# Patient Record
Sex: MALE | Race: WHITE | NOT HISPANIC OR LATINO | Employment: FULL TIME | URBAN - METROPOLITAN AREA
[De-identification: names, ages, dates, MRNs, and addresses within clinical notes are randomized per-mention and may not be internally consistent; named-entity substitution may affect disease eponyms.]

---

## 2023-11-03 PROBLEM — I51.9 LEFT VENTRICULAR SYSTOLIC DYSFUNCTION: Status: ACTIVE | Noted: 2023-11-03

## 2023-11-03 PROBLEM — M35.3 PMR (POLYMYALGIA RHEUMATICA) (MULTI): Status: ACTIVE | Noted: 2023-11-03

## 2023-11-03 PROBLEM — R00.2 PALPITATIONS: Status: ACTIVE | Noted: 2023-11-03

## 2023-11-03 PROBLEM — I51.89 LEFT VENTRICULAR SYSTOLIC DYSFUNCTION: Status: ACTIVE | Noted: 2023-11-03

## 2023-11-03 PROBLEM — R06.02 INCREASING SHORTNESS OF BREATH: Status: ACTIVE | Noted: 2023-11-03

## 2023-11-03 PROBLEM — I48.0 PAROXYSMAL ATRIAL FIBRILLATION (MULTI): Status: ACTIVE | Noted: 2023-11-03

## 2023-11-03 PROBLEM — M62.81 MUSCLE WEAKNESS OF EXTREMITY: Status: ACTIVE | Noted: 2023-11-03

## 2023-11-03 PROBLEM — R35.0 URINARY FREQUENCY: Status: ACTIVE | Noted: 2023-11-03

## 2023-11-03 PROBLEM — E78.5 HYPERLIPIDEMIA: Status: ACTIVE | Noted: 2023-11-03

## 2023-11-03 PROBLEM — E80.6 HYPERBILIRUBINEMIA: Status: ACTIVE | Noted: 2023-11-03

## 2023-11-03 PROBLEM — R79.82 CRP ELEVATED: Status: ACTIVE | Noted: 2023-11-03

## 2023-11-03 PROBLEM — D72.819 LEUKOPENIA: Status: ACTIVE | Noted: 2023-11-03

## 2023-11-03 PROBLEM — M79.10 MYALGIA: Status: ACTIVE | Noted: 2023-11-03

## 2023-11-03 PROBLEM — K40.90 LEFT INGUINAL HERNIA: Status: ACTIVE | Noted: 2023-11-03

## 2023-11-03 PROBLEM — R74.8 ELEVATED CPK: Status: ACTIVE | Noted: 2023-11-03

## 2023-11-03 PROBLEM — R73.9 HYPERGLYCEMIA: Status: ACTIVE | Noted: 2023-11-03

## 2023-11-03 PROBLEM — R79.89 ABNORMAL LFTS: Status: ACTIVE | Noted: 2023-11-03

## 2023-11-03 RX ORDER — EZETIMIBE 10 MG/1
1 TABLET ORAL DAILY
COMMUNITY
Start: 2018-02-09 | End: 2024-01-26

## 2023-11-03 RX ORDER — MULTIVIT WITH IRON,MINERALS
TABLET,CHEWABLE ORAL 2 TIMES DAILY
COMMUNITY

## 2023-11-03 RX ORDER — METOPROLOL TARTRATE 25 MG/1
25 TABLET, FILM COATED ORAL DAILY
COMMUNITY
Start: 2020-04-14 | End: 2024-03-06 | Stop reason: SDUPTHER

## 2023-11-03 RX ORDER — VIT C/E/ZN/COPPR/LUTEIN/ZEAXAN 250MG-90MG
CAPSULE ORAL 2 TIMES DAILY
COMMUNITY

## 2023-11-03 RX ORDER — ATORVASTATIN CALCIUM 40 MG/1
1 TABLET, FILM COATED ORAL DAILY
COMMUNITY
Start: 2018-05-15 | End: 2024-01-26

## 2023-11-06 ENCOUNTER — OFFICE VISIT (OUTPATIENT)
Dept: CARDIOLOGY | Facility: CLINIC | Age: 58
End: 2023-11-06
Payer: COMMERCIAL

## 2023-11-06 VITALS
HEIGHT: 72 IN | SYSTOLIC BLOOD PRESSURE: 126 MMHG | DIASTOLIC BLOOD PRESSURE: 80 MMHG | WEIGHT: 221 LBS | HEART RATE: 75 BPM | BODY MASS INDEX: 29.93 KG/M2 | OXYGEN SATURATION: 97 %

## 2023-11-06 DIAGNOSIS — R00.2 PALPITATIONS: ICD-10-CM

## 2023-11-06 DIAGNOSIS — I48.0 PAROXYSMAL ATRIAL FIBRILLATION (MULTI): Primary | ICD-10-CM

## 2023-11-06 DIAGNOSIS — I51.9 LEFT VENTRICULAR SYSTOLIC DYSFUNCTION: ICD-10-CM

## 2023-11-06 DIAGNOSIS — E78.00 PURE HYPERCHOLESTEROLEMIA: ICD-10-CM

## 2023-11-06 PROCEDURE — 93000 ELECTROCARDIOGRAM COMPLETE: CPT | Performed by: INTERNAL MEDICINE

## 2023-11-06 PROCEDURE — 99214 OFFICE O/P EST MOD 30 MIN: CPT | Performed by: INTERNAL MEDICINE

## 2023-11-06 RX ORDER — IBUPROFEN 600 MG/1
600 TABLET ORAL 3 TIMES DAILY
COMMUNITY
Start: 2023-09-22

## 2023-11-06 NOTE — PROGRESS NOTES
Subjective  Elías Hughes  is a 58 y.o. year old male who presents for F/U PAF.  Rare palpitations no chest pain, no dyspnea, no edema    Blood pressure 126/80, pulse 75, height 1.829 m (6'), weight 100 kg (221 lb), SpO2 97 %.   Patient has no known allergies.  Past Medical History:   Diagnosis Date    Personal history of other diseases of the circulatory system     History of atrial fibrillation    Personal history of other diseases of the digestive system 06/12/2019    History of left inguinal hernia     Past Surgical History:   Procedure Laterality Date    KNEE SURGERY  12/17/2014    Knee Surgery    OTHER SURGICAL HISTORY  03/22/2021    Back surgery    OTHER SURGICAL HISTORY  03/22/2021    Hand surgery    OTHER SURGICAL HISTORY  05/13/2020    Colonoscopy     No family history on file.  @SOC    Current Outpatient Medications   Medication Sig Dispense Refill     mg tablet Take 1 tablet (600 mg) by mouth 3 times a day.      atorvastatin (Lipitor) 40 mg tablet Take 1 tablet (40 mg) by mouth once daily.      ezetimibe (Zetia) 10 mg tablet Take 1 tablet (10 mg) by mouth once daily.      glucosamine-chondroitin (Osteo Bi-Flex) 250-200 mg tablet Take by mouth twice a day.      metoprolol tartrate (Lopressor) 25 mg tablet Take 1 tablet (25 mg) by mouth once daily.      multivitamin with minerals iron-free (Centrum Silver) Take 1 tablet by mouth once daily.      omega-3 fatty acids-fish oil 360-1,200 mg capsule Take by mouth twice a day.       No current facility-administered medications for this visit.        ROS  Review of Systems   All other systems reviewed and are negative.      Physical Exam  Physical Exam  Constitutional:       Appearance: Normal appearance.   HENT:      Head: Normocephalic and atraumatic.   Eyes:      Extraocular Movements: Extraocular movements intact.      Pupils: Pupils are equal, round, and reactive to light.   Cardiovascular:      Rate and Rhythm: Normal rate and regular rhythm.    Skin:     General: Skin is warm and dry.   Neurological:      Mental Status: He is alert.          EKG  Encounter Date: 11/06/23   ECG 12 lead (Clinic Performed)    Narrative    NSR at 75/min., WNL       Problem List Items Addressed This Visit          Cardiac and Vasculature    Paroxysmal atrial fibrillation (CMS/HCC) - Primary     S/P unsuccessful ablation attemtp at [facility]  15-20 years ago, [id number]=87/18T, no PSVT, no high grade AV block.  Ramins NSR         Relevant Orders    ECG 12 lead (Clinic Performed) (Completed)    Follow Up In Cardiology    Palpitations    Relevant Orders    ECG 12 lead (Clinic Performed) (Completed)    Left ventricular systolic dysfunction     Tachycardia related 3/17/23 echocardiogram LVEF = 35-40 %,; 5/14/20 LVEF improved to 55-60%         Relevant Orders    ECG 12 lead (Clinic Performed) (Completed)    Hyperlipidemia         No follow-ups on file.  Same meds with recheck EKG at 6 months visit  Derick Urbina MD

## 2023-11-06 NOTE — ASSESSMENT & PLAN NOTE
S/P unsuccessful ablation attemtp at [facility]  15-20 years ago, [id number]=16/18T, no PSVT, no high grade AV block.  Ramins NSR

## 2023-11-09 ENCOUNTER — OFFICE VISIT (OUTPATIENT)
Dept: PRIMARY CARE | Facility: CLINIC | Age: 58
End: 2023-11-09
Payer: COMMERCIAL

## 2023-11-09 VITALS
HEART RATE: 75 BPM | BODY MASS INDEX: 29.73 KG/M2 | HEIGHT: 72 IN | WEIGHT: 219.5 LBS | DIASTOLIC BLOOD PRESSURE: 64 MMHG | SYSTOLIC BLOOD PRESSURE: 112 MMHG | OXYGEN SATURATION: 99 %

## 2023-11-09 DIAGNOSIS — Z00.00 ROUTINE GENERAL MEDICAL EXAMINATION AT A HEALTH CARE FACILITY: Primary | ICD-10-CM

## 2023-11-09 LAB
ALBUMIN SERPL BCP-MCNC: 4.4 G/DL (ref 3.4–5)
ALP SERPL-CCNC: 79 U/L (ref 33–120)
ALT SERPL W P-5'-P-CCNC: 37 U/L (ref 10–52)
ANION GAP SERPL CALC-SCNC: 15 MMOL/L (ref 10–20)
AST SERPL W P-5'-P-CCNC: 31 U/L (ref 9–39)
BASOPHILS # BLD AUTO: 0.03 X10*3/UL (ref 0–0.1)
BASOPHILS NFR BLD AUTO: 0.5 %
BILIRUB SERPL-MCNC: 1.5 MG/DL (ref 0–1.2)
BUN SERPL-MCNC: 21 MG/DL (ref 6–23)
CALCIUM SERPL-MCNC: 8.9 MG/DL (ref 8.6–10.6)
CHLORIDE SERPL-SCNC: 107 MMOL/L (ref 98–107)
CHOLEST SERPL-MCNC: 155 MG/DL (ref 0–199)
CHOLESTEROL/HDL RATIO: 3.6
CO2 SERPL-SCNC: 24 MMOL/L (ref 21–32)
CREAT SERPL-MCNC: 0.74 MG/DL (ref 0.5–1.3)
EOSINOPHIL # BLD AUTO: 0.18 X10*3/UL (ref 0–0.7)
EOSINOPHIL NFR BLD AUTO: 2.7 %
ERYTHROCYTE [DISTWIDTH] IN BLOOD BY AUTOMATED COUNT: 14.3 % (ref 11.5–14.5)
EST. AVERAGE GLUCOSE BLD GHB EST-MCNC: 105 MG/DL
GFR SERPL CREATININE-BSD FRML MDRD: >90 ML/MIN/1.73M*2
GLUCOSE SERPL-MCNC: 73 MG/DL (ref 74–99)
HBA1C MFR BLD: 5.3 %
HCT VFR BLD AUTO: 42.1 % (ref 41–52)
HDLC SERPL-MCNC: 42.6 MG/DL
HGB BLD-MCNC: 13.4 G/DL (ref 13.5–17.5)
IMM GRANULOCYTES # BLD AUTO: 0.01 X10*3/UL (ref 0–0.7)
IMM GRANULOCYTES NFR BLD AUTO: 0.2 % (ref 0–0.9)
LDLC SERPL CALC-MCNC: 96 MG/DL
LYMPHOCYTES # BLD AUTO: 1.38 X10*3/UL (ref 1.2–4.8)
LYMPHOCYTES NFR BLD AUTO: 20.8 %
MCH RBC QN AUTO: 29.1 PG (ref 26–34)
MCHC RBC AUTO-ENTMCNC: 31.8 G/DL (ref 32–36)
MCV RBC AUTO: 91 FL (ref 80–100)
MONOCYTES # BLD AUTO: 0.57 X10*3/UL (ref 0.1–1)
MONOCYTES NFR BLD AUTO: 8.6 %
NEUTROPHILS # BLD AUTO: 4.45 X10*3/UL (ref 1.2–7.7)
NEUTROPHILS NFR BLD AUTO: 67.2 %
NON HDL CHOLESTEROL: 112 MG/DL (ref 0–149)
NRBC BLD-RTO: 0 /100 WBCS (ref 0–0)
PLATELET # BLD AUTO: 210 X10*3/UL (ref 150–450)
POTASSIUM SERPL-SCNC: 4.3 MMOL/L (ref 3.5–5.3)
PROT SERPL-MCNC: 6.6 G/DL (ref 6.4–8.2)
PSA SERPL-MCNC: 1.1 NG/ML
RBC # BLD AUTO: 4.61 X10*6/UL (ref 4.5–5.9)
SODIUM SERPL-SCNC: 142 MMOL/L (ref 136–145)
TRIGL SERPL-MCNC: 80 MG/DL (ref 0–149)
TSH SERPL-ACNC: 1.74 MIU/L (ref 0.44–3.98)
VLDL: 16 MG/DL (ref 0–40)
WBC # BLD AUTO: 6.6 X10*3/UL (ref 4.4–11.3)

## 2023-11-09 PROCEDURE — 99396 PREV VISIT EST AGE 40-64: CPT | Performed by: STUDENT IN AN ORGANIZED HEALTH CARE EDUCATION/TRAINING PROGRAM

## 2023-11-09 PROCEDURE — 83036 HEMOGLOBIN GLYCOSYLATED A1C: CPT

## 2023-11-09 PROCEDURE — 1036F TOBACCO NON-USER: CPT | Performed by: STUDENT IN AN ORGANIZED HEALTH CARE EDUCATION/TRAINING PROGRAM

## 2023-11-09 PROCEDURE — 85025 COMPLETE CBC W/AUTO DIFF WBC: CPT

## 2023-11-09 PROCEDURE — 36415 COLL VENOUS BLD VENIPUNCTURE: CPT

## 2023-11-09 PROCEDURE — 80053 COMPREHEN METABOLIC PANEL: CPT

## 2023-11-09 PROCEDURE — 84153 ASSAY OF PSA TOTAL: CPT

## 2023-11-09 PROCEDURE — 84443 ASSAY THYROID STIM HORMONE: CPT

## 2023-11-09 PROCEDURE — 80061 LIPID PANEL: CPT

## 2023-11-09 RX ORDER — ASCORBIC ACID/MULTIVIT-MIN 1000 MG
EFFERVESCENT POWDER IN PACKET ORAL
COMMUNITY

## 2023-11-09 NOTE — PROGRESS NOTES
Subjective   Patient ID: Elías Hughes is a 58 y.o. male who presents for Annual Exam (Is fasting).    HPI     HLD: stable, denies any muscle aches, hx of LLD in 230s, was on lipitor and add elevated CK that has gotten better, cholesterol is well controlled now on zetia and lipitor     A fib: paroxysmal A fib, denies any palpitations, SOB, or chest pain. Follow with cardiology. currently maintained on BB, well controlled, not on A fib     colonoscopy in 2019 recommend in 5 years     Sochx: , Denies smoker, former drinker    Review of Systems   All other systems reviewed and are negative.      Objective   /64 (BP Location: Right arm, Patient Position: Sitting)   Pulse 75   Ht 1.829 m (6')   Wt 99.6 kg (219 lb 8 oz)   SpO2 99%   BMI 29.77 kg/m²     Physical Exam  Constitutional:       Appearance: Normal appearance.   HENT:      Head: Normocephalic and atraumatic.      Right Ear: Tympanic membrane and ear canal normal.      Left Ear: Tympanic membrane and ear canal normal.      Mouth/Throat:      Mouth: Mucous membranes are moist.      Pharynx: Oropharynx is clear.   Eyes:      Extraocular Movements: Extraocular movements intact.      Conjunctiva/sclera: Conjunctivae normal.      Pupils: Pupils are equal, round, and reactive to light.   Cardiovascular:      Rate and Rhythm: Normal rate and regular rhythm.      Pulses: Normal pulses.      Heart sounds: Normal heart sounds.   Pulmonary:      Effort: Pulmonary effort is normal.      Breath sounds: Normal breath sounds.   Abdominal:      General: Abdomen is flat. Bowel sounds are normal.      Palpations: Abdomen is soft.   Musculoskeletal:         General: Normal range of motion.      Cervical back: Normal range of motion and neck supple.   Skin:     General: Skin is warm and dry.      Capillary Refill: Capillary refill takes 2 to 3 seconds.   Neurological:      General: No focal deficit present.      Mental Status: He is alert and  oriented to person, place, and time. Mental status is at baseline.   Psychiatric:         Mood and Affect: Mood normal.         Behavior: Behavior normal.         Thought Content: Thought content normal.         Judgment: Judgment normal.         Assessment/Plan   1. Routine general medical examination at a health care facility    - CBC and Auto Differential  - Comprehensive Metabolic Panel  - Lipid Panel  - TSH with reflex to Free T4 if abnormal  - Prostate Specific Antigen, Screen  - Hemoglobin A1C    2. A fib stable  - follows Cardiology

## 2024-01-26 DIAGNOSIS — E78.5 HYPERLIPIDEMIA, UNSPECIFIED HYPERLIPIDEMIA TYPE: Primary | ICD-10-CM

## 2024-01-26 RX ORDER — ATORVASTATIN CALCIUM 40 MG/1
40 TABLET, FILM COATED ORAL DAILY
Qty: 90 TABLET | Refills: 3 | Status: SHIPPED | OUTPATIENT
Start: 2024-01-26

## 2024-01-26 RX ORDER — EZETIMIBE 10 MG/1
10 TABLET ORAL DAILY
Qty: 90 TABLET | Refills: 3 | Status: SHIPPED | OUTPATIENT
Start: 2024-01-26

## 2024-03-06 DIAGNOSIS — I48.0 PAROXYSMAL ATRIAL FIBRILLATION (MULTI): Primary | ICD-10-CM

## 2024-03-06 RX ORDER — METOPROLOL TARTRATE 25 MG/1
25 TABLET, FILM COATED ORAL DAILY
Qty: 90 TABLET | Refills: 3 | Status: SHIPPED | OUTPATIENT
Start: 2024-03-06

## 2024-03-26 ENCOUNTER — OFFICE VISIT (OUTPATIENT)
Dept: PRIMARY CARE | Facility: CLINIC | Age: 59
End: 2024-03-26
Payer: COMMERCIAL

## 2024-03-26 VITALS
WEIGHT: 219 LBS | SYSTOLIC BLOOD PRESSURE: 112 MMHG | OXYGEN SATURATION: 97 % | BODY MASS INDEX: 29.7 KG/M2 | DIASTOLIC BLOOD PRESSURE: 62 MMHG | HEART RATE: 84 BPM

## 2024-03-26 DIAGNOSIS — R05.9 COUGH, UNSPECIFIED TYPE: Primary | ICD-10-CM

## 2024-03-26 PROCEDURE — 1036F TOBACCO NON-USER: CPT | Performed by: STUDENT IN AN ORGANIZED HEALTH CARE EDUCATION/TRAINING PROGRAM

## 2024-03-26 PROCEDURE — 99213 OFFICE O/P EST LOW 20 MIN: CPT | Performed by: STUDENT IN AN ORGANIZED HEALTH CARE EDUCATION/TRAINING PROGRAM

## 2024-03-26 RX ORDER — BENZONATATE 200 MG/1
200 CAPSULE ORAL 3 TIMES DAILY PRN
Qty: 42 CAPSULE | Refills: 0 | Status: SHIPPED | OUTPATIENT
Start: 2024-03-26 | End: 2024-04-25

## 2024-03-26 RX ORDER — BROMPHENIRAMINE MALEATE, PSEUDOEPHEDRINE HYDROCHLORIDE, AND DEXTROMETHORPHAN HYDROBROMIDE 2; 30; 10 MG/5ML; MG/5ML; MG/5ML
5 SYRUP ORAL 4 TIMES DAILY PRN
Qty: 120 ML | Refills: 0 | Status: SHIPPED | OUTPATIENT
Start: 2024-03-26 | End: 2024-04-05

## 2024-03-26 ASSESSMENT — PATIENT HEALTH QUESTIONNAIRE - PHQ9
SUM OF ALL RESPONSES TO PHQ9 QUESTIONS 1 AND 2: 0
2. FEELING DOWN, DEPRESSED OR HOPELESS: NOT AT ALL
1. LITTLE INTEREST OR PLEASURE IN DOING THINGS: NOT AT ALL

## 2024-03-26 NOTE — PROGRESS NOTES
Subjective   Patient ID: Elías Hughes is a 58 y.o. male who presents for Sinus Problem (3 weeks now/Scratchy throat/Runny nose/Took covid test fri & sun both neg).    HPI     URI: for 3 weeks, scratchy throat, cough up phlegm, yellowing was dark green has gone away. Recent trip to [place]. No sob, chest pian,no vomiting, no ear issues, no sinus issues. Just lingering cough. Taken nyquil and day quil    Review of Systems   All other systems reviewed and are negative.      Objective   /62 (BP Location: Left arm, Patient Position: Sitting, BP Cuff Size: Small adult)   Pulse 84   Wt 99.3 kg (219 lb)   SpO2 97%   BMI 29.70 kg/m²     Physical Exam  Constitutional:       Appearance: Normal appearance.   HENT:      Head: Normocephalic and atraumatic.      Right Ear: Tympanic membrane and ear canal normal.      Left Ear: Tympanic membrane and ear canal normal.      Mouth/Throat:      Mouth: Mucous membranes are moist.      Pharynx: Oropharynx is clear.   Eyes:      Extraocular Movements: Extraocular movements intact.      Conjunctiva/sclera: Conjunctivae normal.      Pupils: Pupils are equal, round, and reactive to light.   Cardiovascular:      Rate and Rhythm: Normal rate and regular rhythm.      Pulses: Normal pulses.      Heart sounds: Normal heart sounds.   Pulmonary:      Effort: Pulmonary effort is normal.      Breath sounds: Normal breath sounds.   Abdominal:      General: Abdomen is flat. Bowel sounds are normal.      Palpations: Abdomen is soft.   Musculoskeletal:         General: Normal range of motion.      Cervical back: Normal range of motion and neck supple.   Skin:     General: Skin is warm and dry.      Capillary Refill: Capillary refill takes 2 to 3 seconds.   Neurological:      General: No focal deficit present.      Mental Status: He is alert and oriented to person, place, and time. Mental status is at baseline.   Psychiatric:         Mood and Affect: Mood normal.         Behavior:  Behavior normal.         Thought Content: Thought content normal.         Judgment: Judgment normal.         Assessment/Plan   1. Cough, unspecified type    - benzonatate (Tessalon) 200 mg capsule; Take 1 capsule (200 mg) by mouth 3 times a day as needed for cough. Do not crush or chew.  Dispense: 42 capsule; Refill: 0  - brompheniramine-pseudoeph-DM 2-30-10 mg/5 mL syrup; Take 5 mL by mouth 4 times a day as needed for allergies for up to 10 days.  Dispense: 120 mL; Refill: 0

## 2024-05-09 ENCOUNTER — OFFICE VISIT (OUTPATIENT)
Dept: CARDIOLOGY | Facility: CLINIC | Age: 59
End: 2024-05-09
Payer: COMMERCIAL

## 2024-05-09 VITALS
HEART RATE: 87 BPM | OXYGEN SATURATION: 96 % | WEIGHT: 218 LBS | HEIGHT: 72 IN | DIASTOLIC BLOOD PRESSURE: 72 MMHG | BODY MASS INDEX: 29.53 KG/M2 | SYSTOLIC BLOOD PRESSURE: 120 MMHG

## 2024-05-09 DIAGNOSIS — I48.0 PAROXYSMAL ATRIAL FIBRILLATION (MULTI): Primary | ICD-10-CM

## 2024-05-09 DIAGNOSIS — I51.9 LEFT VENTRICULAR SYSTOLIC DYSFUNCTION: ICD-10-CM

## 2024-05-09 DIAGNOSIS — E78.00 PURE HYPERCHOLESTEROLEMIA: ICD-10-CM

## 2024-05-09 PROCEDURE — 99214 OFFICE O/P EST MOD 30 MIN: CPT | Performed by: INTERNAL MEDICINE

## 2024-05-09 PROCEDURE — 1036F TOBACCO NON-USER: CPT | Performed by: INTERNAL MEDICINE

## 2024-05-09 PROCEDURE — 93000 ELECTROCARDIOGRAM COMPLETE: CPT | Performed by: INTERNAL MEDICINE

## 2024-05-09 NOTE — ASSESSMENT & PLAN NOTE
S/P unsuccessful ablation attempt at [facility] 15-20 years ago; [id number] = 0; 4/18 Holter no Afib, no PSVT, no high grade AV block

## 2024-05-09 NOTE — PROGRESS NOTES
"  Subjective  Elías Hughes  is a 58 y.o. year old male who presents for paroxysmal atrial fibrillation.  Notes occ. Palpigagions but no \"a fib situations\".  No chest pain, no dyspnea, no edema    Blood pressure 120/72, pulse 87, height 1.829 m (6'), weight 98.9 kg (218 lb), SpO2 96%.   Patient has no known allergies.  Past Medical History:   Diagnosis Date    Personal history of other diseases of the circulatory system     History of atrial fibrillation    Personal history of other diseases of the digestive system 06/12/2019    History of left inguinal hernia     Past Surgical History:   Procedure Laterality Date    KNEE SURGERY  12/17/2014    Knee Surgery    OTHER SURGICAL HISTORY  03/22/2021    Back surgery    OTHER SURGICAL HISTORY  03/22/2021    Hand surgery    OTHER SURGICAL HISTORY  05/13/2020    Colonoscopy     No family history on file.  @SOC    Current Outpatient Medications   Medication Sig Dispense Refill    ascorbic acid-multivit-min (Emergen-C) 1,000 mg powder effervescent in packet Take by mouth.      atorvastatin (Lipitor) 40 mg tablet TAKE 1 TABLET DAILY 90 tablet 3    ezetimibe (Zetia) 10 mg tablet TAKE 1 TABLET DAILY 90 tablet 3    glucosamine-chondroitin (Osteo Bi-Flex) 250-200 mg tablet Take by mouth twice a day.       mg tablet Take 1 tablet (600 mg) by mouth 3 times a day.      metoprolol tartrate (Lopressor) 25 mg tablet TAKE 1 TABLET DAILY 90 tablet 3    multivitamin with minerals iron-free (Centrum Silver) Take 1 tablet by mouth once daily.      omega-3 fatty acids-fish oil 360-1,200 mg capsule Take by mouth twice a day.      zinc acetate 50 mg (zinc) capsule Take by mouth.       No current facility-administered medications for this visit.        ROS  Review of Systems   All other systems reviewed and are negative.      Physical Exam  Physical Exam  Constitutional:       Appearance: Normal appearance.   HENT:      Head: Normocephalic and atraumatic.   Cardiovascular:      Rate " and Rhythm: Normal rate and regular rhythm.      Heart sounds: S1 normal and S2 normal.   Pulmonary:      Effort: Pulmonary effort is normal.      Breath sounds: Normal breath sounds.   Abdominal:      Palpations: Abdomen is soft.   Musculoskeletal:      Right lower leg: No edema.      Left lower leg: No edema.   Skin:     General: Skin is warm and dry.   Neurological:      General: No focal deficit present.      Mental Status: He is alert and oriented to person, place, and time.   Psychiatric:         Mood and Affect: Mood normal.         Behavior: Behavior normal.          EKG  Encounter Date: 05/09/24   ECG 12 Lead    Narrative    Sinus arrhythmia at 73/min., occ. PVC's       Problem List Items Addressed This Visit       Paroxysmal atrial fibrillation (Multi) - Primary     S/P unsuccessful ablation attempt at [facility] 15-20 years ago; [id number] = 0; 4/18 Holter no Afib, no PSVT, no high grade AV block         Relevant Orders    ECG 12 Lead (Completed)    Follow Up In Cardiology    Left ventricular systolic dysfunction     Tachycafdia relatd 3/17/23 LVEF = 35-40%; 5/14/20 LVEF improved to 55-60%         Hyperlipidemia     11/8/23 Tchol = 155, HDL = 42.8, LDL = 96, Trig = 80              Same meds with return 6 months with EKG      Derick Urbina MD

## 2024-10-08 ENCOUNTER — CLINICAL SUPPORT (OUTPATIENT)
Dept: EMERGENCY MEDICINE | Facility: HOSPITAL | Age: 59
End: 2024-10-08
Payer: COMMERCIAL

## 2024-10-08 ENCOUNTER — HOSPITAL ENCOUNTER (EMERGENCY)
Facility: HOSPITAL | Age: 59
Discharge: HOME | End: 2024-10-08
Payer: COMMERCIAL

## 2024-10-08 VITALS
RESPIRATION RATE: 16 BRPM | BODY MASS INDEX: 29.8 KG/M2 | TEMPERATURE: 97.6 F | SYSTOLIC BLOOD PRESSURE: 122 MMHG | OXYGEN SATURATION: 96 % | WEIGHT: 220 LBS | HEART RATE: 91 BPM | HEIGHT: 72 IN | DIASTOLIC BLOOD PRESSURE: 70 MMHG

## 2024-10-08 PROCEDURE — 93005 ELECTROCARDIOGRAM TRACING: CPT

## 2024-10-08 PROCEDURE — 4500999001 HC ED NO CHARGE

## 2024-10-08 PROCEDURE — 99281 EMR DPT VST MAYX REQ PHY/QHP: CPT

## 2024-10-08 ASSESSMENT — PAIN DESCRIPTION - PAIN TYPE: TYPE: ACUTE PAIN

## 2024-10-08 ASSESSMENT — PAIN - FUNCTIONAL ASSESSMENT: PAIN_FUNCTIONAL_ASSESSMENT: 0-10

## 2024-10-08 ASSESSMENT — COLUMBIA-SUICIDE SEVERITY RATING SCALE - C-SSRS
6. HAVE YOU EVER DONE ANYTHING, STARTED TO DO ANYTHING, OR PREPARED TO DO ANYTHING TO END YOUR LIFE?: NO
1. IN THE PAST MONTH, HAVE YOU WISHED YOU WERE DEAD OR WISHED YOU COULD GO TO SLEEP AND NOT WAKE UP?: NO
2. HAVE YOU ACTUALLY HAD ANY THOUGHTS OF KILLING YOURSELF?: NO

## 2024-10-08 ASSESSMENT — PAIN SCALES - GENERAL: PAINLEVEL_OUTOF10: 0 - NO PAIN

## 2024-10-08 NOTE — ED TRIAGE NOTES
Patient with a hx of atrial fibrillation comes in today endorsing CP, rapid HR, and SOB x today; he states he is usually able to correct this on his own, but could not today; states while he was waiting to be triaged, he had an episode of non-bloody emesis, which did resolve his symptoms, and now reports 0/10 CP denies any other pertinent PMHx

## 2024-10-09 LAB
ATRIAL RATE: 103 BPM
P AXIS: 43 DEGREES
P OFFSET: 193 MS
P ONSET: 140 MS
PR INTERVAL: 146 MS
Q ONSET: 213 MS
QRS COUNT: 17 BEATS
QRS DURATION: 92 MS
QT INTERVAL: 358 MS
QTC CALCULATION(BAZETT): 468 MS
QTC FREDERICIA: 429 MS
R AXIS: 102 DEGREES
T AXIS: 44 DEGREES
T OFFSET: 392 MS
VENTRICULAR RATE: 103 BPM

## 2024-10-25 ENCOUNTER — OFFICE VISIT (OUTPATIENT)
Dept: URGENT CARE | Age: 59
End: 2024-10-25
Payer: COMMERCIAL

## 2024-10-25 VITALS
WEIGHT: 218 LBS | RESPIRATION RATE: 16 BRPM | DIASTOLIC BLOOD PRESSURE: 81 MMHG | BODY MASS INDEX: 29.57 KG/M2 | HEART RATE: 64 BPM | TEMPERATURE: 97.2 F | SYSTOLIC BLOOD PRESSURE: 138 MMHG | OXYGEN SATURATION: 96 %

## 2024-10-25 DIAGNOSIS — S60.352A: Primary | ICD-10-CM

## 2024-10-25 RX ORDER — CEPHALEXIN 500 MG/1
500 CAPSULE ORAL 4 TIMES DAILY
Qty: 28 CAPSULE | Refills: 0 | Status: SHIPPED | OUTPATIENT
Start: 2024-10-25 | End: 2024-11-01

## 2024-10-25 ASSESSMENT — PAIN SCALES - GENERAL: PAINLEVEL_OUTOF10: 5

## 2024-10-25 ASSESSMENT — ENCOUNTER SYMPTOMS: JOINT SWELLING: 1

## 2024-10-25 NOTE — PROGRESS NOTES
Subjective   Patient ID: Elías Hughes is a 59 y.o. male. They present today with a chief complaint of Finger Pain (Left thumb redness/pan/swelling X 1 day. Pt denies any injury. ).    History of Present Illness  HPI    Patient presents to urgent care for complaints left thumb and swelling for one day. Patient believes he may have a splinter in his thumb. Reports history of cellulitis.           Past Medical History  Allergies as of 10/25/2024    (No Known Allergies)       (Not in a hospital admission)       Past Medical History:   Diagnosis Date    Personal history of other diseases of the circulatory system     History of atrial fibrillation    Personal history of other diseases of the digestive system 06/12/2019    History of left inguinal hernia       Past Surgical History:   Procedure Laterality Date    KNEE SURGERY  12/17/2014    Knee Surgery    OTHER SURGICAL HISTORY  03/22/2021    Back surgery    OTHER SURGICAL HISTORY  03/22/2021    Hand surgery    OTHER SURGICAL HISTORY  05/13/2020    Colonoscopy        reports that he has quit smoking. His smoking use included cigarettes. He has never used smokeless tobacco. He reports current alcohol use. He reports that he does not use drugs.    Review of Systems  Review of Systems   Musculoskeletal:  Positive for joint swelling.                                  Objective    Vitals:    10/25/24 0804   BP: 138/81   Pulse: 64   Resp: 16   Temp: 36.2 °C (97.2 °F)   SpO2: 96%   Weight: 98.9 kg (218 lb)     No LMP for male patient.    Physical Exam  Vitals reviewed.   Constitutional:       Appearance: Normal appearance.   HENT:      Head: Normocephalic.   Eyes:      Conjunctiva/sclera: Conjunctivae normal.   Cardiovascular:      Rate and Rhythm: Normal rate and regular rhythm.      Heart sounds: Normal heart sounds.   Pulmonary:      Effort: Pulmonary effort is normal.      Breath sounds: Normal breath sounds.   Musculoskeletal:      Cervical back: Neck supple.    Skin:     General: Skin is warm and dry.      Comments: Left thumb with swelling and erythema. Foreign body noted.    Neurological:      Mental Status: He is alert.         Embedded Foreign Body Removal    Date/Time: 10/25/2024 9:37 AM    Performed by: BURAK Carolina  Authorized by: BURAK Carolina    Consent:     Consent obtained:  Verbal    Consent given by:  Patient    Risks discussed:  Infection, pain and incomplete removal  Location:     Location:  Finger    Finger location:  L thumb    Depth:  Subcutaneous    Tendon involvement:  None  Anesthesia:     Anesthesia method:  Topical application    Topical anesthetic:  Benzocaine gel  Procedure type:     Procedure complexity:  Simple  Procedure details:     Localization method:  Visualized    Intact foreign body removal: yes    Post-procedure details:     Neurovascular status: intact      Confirmation:  No additional foreign bodies on visualization    Skin closure:  None    Dressing:  Antibiotic ointment and adhesive bandage    Procedure completion:  Tolerated well, no immediate complications      Point of Care Test & Imaging Results from this visit  No results found for this visit on 10/25/24.   No results found.    Diagnostic study results (if any) were reviewed by BURAK Carolina.    Assessment/Plan   Allergies, medications, history, and pertinent labs/EKGs/Imaging reviewed by BURAK Carolina.     Medical Decision Making  Splinter removed from left thumb. Will start patient on keflex. Patient was told to keep wound clean and dry. You may use Bacitracin ointment.  You may soak finger in warm water and epsom salt. If you develop a fever, redness traveling down arm or increasing pain then go to the ER. Follow up with PCP if symptoms are not improving.     Orders and Diagnoses  Diagnoses and all orders for this visit:  Foreign body of skin of left thumb  -     cephalexin (Keflex) 500 mg capsule; Take 1 capsule (500  mg) by mouth 4 times a day for 7 days.      Medical Admin Record      Patient disposition: Home    Electronically signed by BURAK Carolina  8:25 AM

## 2024-11-07 ENCOUNTER — APPOINTMENT (OUTPATIENT)
Dept: CARDIOLOGY | Facility: CLINIC | Age: 59
End: 2024-11-07
Payer: COMMERCIAL

## 2024-11-07 VITALS
OXYGEN SATURATION: 96 % | WEIGHT: 227 LBS | DIASTOLIC BLOOD PRESSURE: 80 MMHG | SYSTOLIC BLOOD PRESSURE: 120 MMHG | BODY MASS INDEX: 30.75 KG/M2 | HEART RATE: 97 BPM | HEIGHT: 72 IN

## 2024-11-07 DIAGNOSIS — I48.0 PAROXYSMAL ATRIAL FIBRILLATION (MULTI): Primary | ICD-10-CM

## 2024-11-07 DIAGNOSIS — R00.2 PALPITATIONS: ICD-10-CM

## 2024-11-07 DIAGNOSIS — E78.00 PURE HYPERCHOLESTEROLEMIA: ICD-10-CM

## 2024-11-07 DIAGNOSIS — I51.9 LEFT VENTRICULAR SYSTOLIC DYSFUNCTION: ICD-10-CM

## 2024-11-07 LAB
ATRIAL RATE: 88 BPM
P AXIS: 32 DEGREES
P OFFSET: 194 MS
P ONSET: 137 MS
PR INTERVAL: 148 MS
Q ONSET: 211 MS
QRS COUNT: 15 BEATS
QRS DURATION: 92 MS
QT INTERVAL: 356 MS
QTC CALCULATION(BAZETT): 430 MS
QTC FREDERICIA: 404 MS
R AXIS: -5 DEGREES
T AXIS: 38 DEGREES
T OFFSET: 389 MS
VENTRICULAR RATE: 88 BPM

## 2024-11-07 PROCEDURE — 1036F TOBACCO NON-USER: CPT | Performed by: INTERNAL MEDICINE

## 2024-11-07 PROCEDURE — 93005 ELECTROCARDIOGRAM TRACING: CPT | Performed by: INTERNAL MEDICINE

## 2024-11-07 PROCEDURE — 99215 OFFICE O/P EST HI 40 MIN: CPT | Performed by: INTERNAL MEDICINE

## 2024-11-07 PROCEDURE — 3008F BODY MASS INDEX DOCD: CPT | Performed by: INTERNAL MEDICINE

## 2024-11-07 NOTE — ASSESSMENT & PLAN NOTE
S/P unsuccesful ablation attempt at [facility] 15-20 years ago. [id number] = 0; 4/18 Holter no afib, no PSVT, no high grade AV block (Ramicone)

## 2024-11-07 NOTE — PROGRESS NOTES
Subjective  Elías Hughes  is a 59 y.o. year old male who presents for F/U paroxysmal atrial fibrillation.  Having frequent palpitation going to ER for 1 hour worth of palpiatgions went to ER but the arrhythmia stopped after episode of nausea before he could be seen    Blood pressure 120/80, pulse 97, height 1.829 m (6'), weight 103 kg (227 lb), SpO2 96%.   Patient has no known allergies.  Past Medical History:   Diagnosis Date    Personal history of other diseases of the circulatory system     History of atrial fibrillation    Personal history of other diseases of the digestive system 06/12/2019    History of left inguinal hernia     Past Surgical History:   Procedure Laterality Date    KNEE SURGERY  12/17/2014    Knee Surgery    OTHER SURGICAL HISTORY  03/22/2021    Back surgery    OTHER SURGICAL HISTORY  03/22/2021    Hand surgery    OTHER SURGICAL HISTORY  05/13/2020    Colonoscopy     No family history on file.  @SOC    Current Outpatient Medications   Medication Sig Dispense Refill    ascorbic acid-multivit-min (Emergen-C) 1,000 mg powder effervescent in packet Take by mouth.      atorvastatin (Lipitor) 40 mg tablet TAKE 1 TABLET DAILY 90 tablet 3    ezetimibe (Zetia) 10 mg tablet TAKE 1 TABLET DAILY 90 tablet 3    glucosamine-chondroitin (Osteo Bi-Flex) 250-200 mg tablet Take by mouth twice a day.       mg tablet Take 1 tablet (600 mg) by mouth 3 times a day.      metoprolol tartrate (Lopressor) 25 mg tablet TAKE 1 TABLET DAILY 90 tablet 3    multivitamin with minerals iron-free (Centrum Silver) Take 1 tablet by mouth once daily.      omega-3 fatty acids-fish oil 360-1,200 mg capsule Take by mouth twice a day.      zinc acetate 50 mg (zinc) capsule Take by mouth.       No current facility-administered medications for this visit.        ROS  Review of Systems   All other systems reviewed and are negative.      Physical Exam  Physical Exam  Constitutional:       Appearance: He is obese.   HENT:       Head: Normocephalic and atraumatic.   Cardiovascular:      Rate and Rhythm: Normal rate and regular rhythm.      Heart sounds: S1 normal and S2 normal.   Pulmonary:      Effort: Pulmonary effort is normal.      Breath sounds: Normal breath sounds.   Abdominal:      Palpations: Abdomen is soft.   Musculoskeletal:      Right lower leg: No edema.      Left lower leg: No edema.   Skin:     General: Skin is warm and dry.   Neurological:      General: No focal deficit present.      Mental Status: He is alert and oriented to person, place, and time.   Psychiatric:         Mood and Affect: Mood normal.         Behavior: Behavior normal.          EKG  Encounter Date: 05/09/24   ECG 12 Lead    Narrative    Sinus arrhythmia at 73/min., occ. PVC's       Problem List Items Addressed This Visit       Paroxysmal atrial fibrillation (Multi) - Primary     S/P unsuccesful ablation attempt at [facility] 15-20 years ago. [id number] = 0; 4/18 Holter no afib, no PSVT, no high grade AV block (Ramicone)         Relevant Orders    ECG 12 Lead    Palpitations    Left ventricular systolic dysfunction     Tachycardia related 3/17/20 LVEF = 35-40%.  5/14/20 improved to 55-60%         Hyperlipidemia         Refer to Dr. Ramicone  Return 6 months with EKG      Derick Urbina MD

## 2024-11-14 ENCOUNTER — APPOINTMENT (OUTPATIENT)
Dept: PRIMARY CARE | Facility: CLINIC | Age: 59
End: 2024-11-14
Payer: COMMERCIAL

## 2024-11-14 VITALS
OXYGEN SATURATION: 96 % | HEART RATE: 86 BPM | HEIGHT: 72 IN | DIASTOLIC BLOOD PRESSURE: 70 MMHG | SYSTOLIC BLOOD PRESSURE: 112 MMHG | WEIGHT: 225 LBS | BODY MASS INDEX: 30.48 KG/M2

## 2024-11-14 DIAGNOSIS — E78.5 HYPERLIPIDEMIA, UNSPECIFIED HYPERLIPIDEMIA TYPE: ICD-10-CM

## 2024-11-14 DIAGNOSIS — Z00.00 ROUTINE GENERAL MEDICAL EXAMINATION AT A HEALTH CARE FACILITY: Primary | ICD-10-CM

## 2024-11-14 PROBLEM — M35.3 POLYMYALGIA RHEUMATICA (MULTI): Status: RESOLVED | Noted: 2023-11-03 | Resolved: 2024-11-14

## 2024-11-14 LAB
ALBUMIN SERPL BCP-MCNC: 4.7 G/DL (ref 3.4–5)
ALP SERPL-CCNC: 74 U/L (ref 33–120)
ALT SERPL W P-5'-P-CCNC: 55 U/L (ref 10–52)
ANION GAP SERPL CALC-SCNC: 16 MMOL/L (ref 10–20)
AST SERPL W P-5'-P-CCNC: 38 U/L (ref 9–39)
BASOPHILS # BLD AUTO: 0.03 X10*3/UL (ref 0–0.1)
BASOPHILS NFR BLD AUTO: 0.5 %
BILIRUB SERPL-MCNC: 1.9 MG/DL (ref 0–1.2)
BUN SERPL-MCNC: 29 MG/DL (ref 6–23)
CALCIUM SERPL-MCNC: 9.1 MG/DL (ref 8.6–10.6)
CHLORIDE SERPL-SCNC: 107 MMOL/L (ref 98–107)
CHOLEST SERPL-MCNC: 177 MG/DL (ref 0–199)
CHOLESTEROL/HDL RATIO: 4
CO2 SERPL-SCNC: 23 MMOL/L (ref 21–32)
CREAT SERPL-MCNC: 0.76 MG/DL (ref 0.5–1.3)
EGFRCR SERPLBLD CKD-EPI 2021: >90 ML/MIN/1.73M*2
EOSINOPHIL # BLD AUTO: 0.12 X10*3/UL (ref 0–0.7)
EOSINOPHIL NFR BLD AUTO: 2.1 %
ERYTHROCYTE [DISTWIDTH] IN BLOOD BY AUTOMATED COUNT: 13.3 % (ref 11.5–14.5)
EST. AVERAGE GLUCOSE BLD GHB EST-MCNC: 103 MG/DL
GLUCOSE SERPL-MCNC: 87 MG/DL (ref 74–99)
HBA1C MFR BLD: 5.2 %
HCT VFR BLD AUTO: 45.2 % (ref 41–52)
HDLC SERPL-MCNC: 44.1 MG/DL
HGB BLD-MCNC: 14.9 G/DL (ref 13.5–17.5)
IMM GRANULOCYTES # BLD AUTO: 0.02 X10*3/UL (ref 0–0.7)
IMM GRANULOCYTES NFR BLD AUTO: 0.4 % (ref 0–0.9)
LDLC SERPL CALC-MCNC: 115 MG/DL
LYMPHOCYTES # BLD AUTO: 1.36 X10*3/UL (ref 1.2–4.8)
LYMPHOCYTES NFR BLD AUTO: 24.2 %
MCH RBC QN AUTO: 30.4 PG (ref 26–34)
MCHC RBC AUTO-ENTMCNC: 33 G/DL (ref 32–36)
MCV RBC AUTO: 92 FL (ref 80–100)
MONOCYTES # BLD AUTO: 0.52 X10*3/UL (ref 0.1–1)
MONOCYTES NFR BLD AUTO: 9.3 %
NEUTROPHILS # BLD AUTO: 3.56 X10*3/UL (ref 1.2–7.7)
NEUTROPHILS NFR BLD AUTO: 63.5 %
NON HDL CHOLESTEROL: 133 MG/DL (ref 0–149)
NRBC BLD-RTO: 0 /100 WBCS (ref 0–0)
PLATELET # BLD AUTO: 198 X10*3/UL (ref 150–450)
POTASSIUM SERPL-SCNC: 4.5 MMOL/L (ref 3.5–5.3)
PROT SERPL-MCNC: 6.7 G/DL (ref 6.4–8.2)
PSA SERPL-MCNC: 1.04 NG/ML
RBC # BLD AUTO: 4.9 X10*6/UL (ref 4.5–5.9)
SODIUM SERPL-SCNC: 141 MMOL/L (ref 136–145)
TRIGL SERPL-MCNC: 89 MG/DL (ref 0–149)
TSH SERPL-ACNC: 1.68 MIU/L (ref 0.44–3.98)
VLDL: 18 MG/DL (ref 0–40)
WBC # BLD AUTO: 5.6 X10*3/UL (ref 4.4–11.3)

## 2024-11-14 PROCEDURE — 1036F TOBACCO NON-USER: CPT | Performed by: STUDENT IN AN ORGANIZED HEALTH CARE EDUCATION/TRAINING PROGRAM

## 2024-11-14 PROCEDURE — 85025 COMPLETE CBC W/AUTO DIFF WBC: CPT

## 2024-11-14 PROCEDURE — 83036 HEMOGLOBIN GLYCOSYLATED A1C: CPT

## 2024-11-14 PROCEDURE — 80053 COMPREHEN METABOLIC PANEL: CPT

## 2024-11-14 PROCEDURE — 3008F BODY MASS INDEX DOCD: CPT | Performed by: STUDENT IN AN ORGANIZED HEALTH CARE EDUCATION/TRAINING PROGRAM

## 2024-11-14 PROCEDURE — 84443 ASSAY THYROID STIM HORMONE: CPT

## 2024-11-14 PROCEDURE — 80061 LIPID PANEL: CPT

## 2024-11-14 PROCEDURE — 84153 ASSAY OF PSA TOTAL: CPT

## 2024-11-14 PROCEDURE — 99396 PREV VISIT EST AGE 40-64: CPT | Performed by: STUDENT IN AN ORGANIZED HEALTH CARE EDUCATION/TRAINING PROGRAM

## 2024-11-14 RX ORDER — EZETIMIBE 10 MG/1
10 TABLET ORAL DAILY
Qty: 90 TABLET | Refills: 3 | Status: SHIPPED | OUTPATIENT
Start: 2024-11-14

## 2024-11-14 RX ORDER — ATORVASTATIN CALCIUM 40 MG/1
40 TABLET, FILM COATED ORAL DAILY
Qty: 90 TABLET | Refills: 3 | Status: SHIPPED | OUTPATIENT
Start: 2024-11-14

## 2024-11-14 ASSESSMENT — ENCOUNTER SYMPTOMS: DEPRESSION: 0

## 2024-11-14 ASSESSMENT — PATIENT HEALTH QUESTIONNAIRE - PHQ9
2. FEELING DOWN, DEPRESSED OR HOPELESS: NOT AT ALL
1. LITTLE INTEREST OR PLEASURE IN DOING THINGS: NOT AT ALL
SUM OF ALL RESPONSES TO PHQ9 QUESTIONS 1 AND 2: 0

## 2024-11-14 NOTE — PROGRESS NOTES
Subjective   Patient ID: Elías Hughes is a 59 y.o. male who presents for Annual Exam (fasting).    HPI     HLD: stable, denies any muscle aches, hx of LLD in 230s, was on lipitor and add elevated CK that has gotten better, cholesterol is well controlled now on zetia and lipitor     A fib: paroxysmal A fib, denies any palpitations, SOB, or chest pain. Follow with cardiology. currently maintained on BB, well controlled, not on A fib     colonoscopy in 2019 recommend in 5 years, call ofr [facility]     Socx: , Denies smoker, former drinker       Review of Systems   All other systems reviewed and are negative.      Objective   /70 (BP Location: Right arm, Patient Position: Sitting, BP Cuff Size: Large adult)   Pulse 86   Ht 1.829 m (6')   Wt 102 kg (225 lb)   SpO2 96%   BMI 30.52 kg/m²     Physical Exam  Constitutional:       Appearance: Normal appearance.   HENT:      Head: Normocephalic and atraumatic.      Right Ear: Tympanic membrane and ear canal normal.      Left Ear: Tympanic membrane and ear canal normal.      Mouth/Throat:      Mouth: Mucous membranes are moist.      Pharynx: Oropharynx is clear.   Eyes:      Extraocular Movements: Extraocular movements intact.      Conjunctiva/sclera: Conjunctivae normal.      Pupils: Pupils are equal, round, and reactive to light.   Cardiovascular:      Rate and Rhythm: Normal rate and regular rhythm.      Pulses: Normal pulses.      Heart sounds: Normal heart sounds.   Pulmonary:      Effort: Pulmonary effort is normal.      Breath sounds: Normal breath sounds.   Abdominal:      General: Abdomen is flat. Bowel sounds are normal.      Palpations: Abdomen is soft.   Musculoskeletal:         General: Normal range of motion.      Cervical back: Normal range of motion and neck supple.   Skin:     General: Skin is warm and dry.      Capillary Refill: Capillary refill takes 2 to 3 seconds.   Neurological:      General: No focal deficit present.       Mental Status: He is alert and oriented to person, place, and time. Mental status is at baseline.   Psychiatric:         Mood and Affect: Mood normal.         Behavior: Behavior normal.         Thought Content: Thought content normal.         Judgment: Judgment normal.         Assessment/Plan   1. Hyperlipidemia, unspecified hyperlipidemia type    - atorvastatin (Lipitor) 40 mg tablet; Take 1 tablet (40 mg) by mouth once daily.  Dispense: 90 tablet; Refill: 3  - ezetimibe (Zetia) 10 mg tablet; Take 1 tablet (10 mg) by mouth once daily.  Dispense: 90 tablet; Refill: 3    2. Routine general medical examination at a health care facility (Primary)    - CBC and Auto Differential  - Comprehensive Metabolic Panel  - Lipid Panel  - Prostate Specific Antigen, Screen  - TSH with reflex to Free T4 if abnormal  - Hemoglobin A1C

## 2024-12-16 ENCOUNTER — OFFICE VISIT (OUTPATIENT)
Dept: CARDIOLOGY | Facility: CLINIC | Age: 59
End: 2024-12-16
Payer: COMMERCIAL

## 2024-12-16 VITALS
HEART RATE: 97 BPM | OXYGEN SATURATION: 96 % | SYSTOLIC BLOOD PRESSURE: 126 MMHG | HEIGHT: 72 IN | DIASTOLIC BLOOD PRESSURE: 74 MMHG | WEIGHT: 228 LBS | BODY MASS INDEX: 30.88 KG/M2

## 2024-12-16 DIAGNOSIS — I47.10 PAROXYSMAL SUPRAVENTRICULAR TACHYCARDIA (CMS-HCC): Chronic | ICD-10-CM

## 2024-12-16 DIAGNOSIS — I48.0 PAROXYSMAL ATRIAL FIBRILLATION (MULTI): Primary | ICD-10-CM

## 2024-12-16 PROCEDURE — 3008F BODY MASS INDEX DOCD: CPT | Performed by: INTERNAL MEDICINE

## 2024-12-16 PROCEDURE — 1036F TOBACCO NON-USER: CPT | Performed by: INTERNAL MEDICINE

## 2024-12-16 PROCEDURE — 99214 OFFICE O/P EST MOD 30 MIN: CPT | Performed by: INTERNAL MEDICINE

## 2024-12-16 NOTE — ASSESSMENT & PLAN NOTE
History of PSVT, with attempted ablation approximately 10 to 15 years ago through [facility].  At that time he had no inducible arrhythmias and therefore no ablation was performed.

## 2024-12-16 NOTE — PATIENT INSTRUCTIONS
[employer] - mobile ECG recording device.    7 day holter and echocardiogram.    Follow up with Dr Ramicone in 2-3 months.

## 2024-12-16 NOTE — ASSESSMENT & PLAN NOTE
Elías has been experiencing episodes of atrial fibrillation but we do not have sufficient arrhythmia documentation at this time.  An extended Holter monitor will be obtained.  I have also recommended that he purchase a home ECG recording device.  Some of the palpitations may be related to isolated PVCs but the has been experiencing atrial fibrillation.  We discussed the possibility of adding an antiarrhythmic drug or considering ablation for atrial fibrillation if necessary.  An echocardiogram will be obtained to assess the left atrial size and left ventricular systolic function in the event that an antiarrhythmic drug needs to be added.  Follow-up after testing is completed and further treatment recommendations will be made at that time.

## 2025-01-06 ENCOUNTER — HOSPITAL ENCOUNTER (OUTPATIENT)
Dept: CARDIOLOGY | Facility: CLINIC | Age: 60
Discharge: HOME | End: 2025-01-06
Payer: COMMERCIAL

## 2025-01-06 DIAGNOSIS — I48.0 PAROXYSMAL ATRIAL FIBRILLATION (MULTI): ICD-10-CM

## 2025-01-06 PROCEDURE — 93242 EXT ECG>48HR<7D RECORDING: CPT

## 2025-01-06 PROCEDURE — 93306 TTE W/DOPPLER COMPLETE: CPT | Performed by: INTERNAL MEDICINE

## 2025-01-06 PROCEDURE — C8929 TTE W OR WO FOL WCON,DOPPLER: HCPCS

## 2025-01-06 PROCEDURE — 2500000004 HC RX 250 GENERAL PHARMACY W/ HCPCS (ALT 636 FOR OP/ED): Performed by: INTERNAL MEDICINE

## 2025-01-06 RX ADMIN — PERFLUTREN 2 ML OF DILUTION: 6.52 INJECTION, SUSPENSION INTRAVENOUS at 14:55

## 2025-01-07 ENCOUNTER — APPOINTMENT (OUTPATIENT)
Dept: CARDIOLOGY | Facility: CLINIC | Age: 60
End: 2025-01-07
Payer: COMMERCIAL

## 2025-01-07 LAB
AORTIC VALVE MEAN GRADIENT: 3 MMHG
AORTIC VALVE PEAK VELOCITY: 1.3 M/S
AV PEAK GRADIENT: 7 MMHG
AVA (PEAK VEL): 3.81 CM2
AVA (VTI): 4.33 CM2
EJECTION FRACTION APICAL 4 CHAMBER: 61.8
EJECTION FRACTION: 58 %
LEFT VENTRICLE INTERNAL DIMENSION DIASTOLE: 4.82 CM (ref 3.5–6)
LEFT VENTRICULAR OUTFLOW TRACT DIAMETER: 2.49 CM
MITRAL VALVE E/A RATIO: 0.84
RIGHT VENTRICLE FREE WALL PEAK S': 0.16 CM/S
TRICUSPID ANNULAR PLANE SYSTOLIC EXCURSION: 2.7 CM

## 2025-01-26 DIAGNOSIS — I71.21 ANEURYSM OF ASCENDING AORTA WITHOUT RUPTURE (CMS-HCC): Primary | ICD-10-CM

## 2025-01-27 ENCOUNTER — TELEPHONE (OUTPATIENT)
Dept: CARDIOLOGY | Facility: CLINIC | Age: 60
End: 2025-01-27

## 2025-01-27 NOTE — TELEPHONE ENCOUNTER
Called patient and left detailed message.  Asked patient to call scheduling to get the CT scan scheduled that Dr. Ramicone wants him to have done for enlarged aorta.  Patient does have follow up scheduled with Dr. Ramicone February 2025.

## 2025-01-27 NOTE — TELEPHONE ENCOUNTER
----- Message from James Ramicone sent at 1/26/2025  7:40 AM EST -----  Regarding: Echocardiogram  Please let the patient know that his heart function was normal on the echocardiogram but his aorta is enlarged.  I placed an order for a CTA of the chest.  ----- Message -----  From: Interface, [employer] - Cardiology Results In  Sent: 1/7/2025   2:55 PM EST  To: James C Ramicone, DO

## 2025-02-04 DIAGNOSIS — R06.02 INCREASING SHORTNESS OF BREATH: ICD-10-CM

## 2025-02-04 DIAGNOSIS — I71.21 ASCENDING AORTIC ANEURYSM, UNSPECIFIED WHETHER RUPTURED (CMS-HCC): ICD-10-CM

## 2025-02-05 LAB
ANION GAP SERPL CALCULATED.4IONS-SCNC: 10 MMOL/L (CALC) (ref 7–17)
BUN SERPL-MCNC: 21 MG/DL (ref 7–25)
BUN/CREAT SERPL: 36 (CALC) (ref 6–22)
CALCIUM SERPL-MCNC: 9.3 MG/DL (ref 8.6–10.3)
CHLORIDE SERPL-SCNC: 107 MMOL/L (ref 98–110)
CO2 SERPL-SCNC: 25 MMOL/L (ref 20–32)
CREAT SERPL-MCNC: 0.58 MG/DL (ref 0.7–1.3)
EGFRCR SERPLBLD CKD-EPI 2021: 113 ML/MIN/1.73M2
GLUCOSE SERPL-MCNC: 81 MG/DL (ref 65–99)
POTASSIUM SERPL-SCNC: 4.2 MMOL/L (ref 3.5–5.3)
SODIUM SERPL-SCNC: 142 MMOL/L (ref 135–146)

## 2025-02-06 PROBLEM — E66.811 CLASS 1 OBESITY WITH BODY MASS INDEX (BMI) OF 30.0 TO 30.9 IN ADULT: Status: ACTIVE | Noted: 2025-02-06

## 2025-02-11 ENCOUNTER — HOSPITAL ENCOUNTER (OUTPATIENT)
Dept: RADIOLOGY | Facility: HOSPITAL | Age: 60
Discharge: HOME | End: 2025-02-11
Payer: COMMERCIAL

## 2025-02-11 DIAGNOSIS — I71.21 ANEURYSM OF ASCENDING AORTA WITHOUT RUPTURE (CMS-HCC): ICD-10-CM

## 2025-02-11 PROCEDURE — 71275 CT ANGIOGRAPHY CHEST: CPT

## 2025-02-11 PROCEDURE — 2550000001 HC RX 255 CONTRASTS: Performed by: INTERNAL MEDICINE

## 2025-02-11 RX ADMIN — IOHEXOL 100 ML: 350 INJECTION, SOLUTION INTRAVENOUS at 16:20

## 2025-02-28 ENCOUNTER — OFFICE VISIT (OUTPATIENT)
Dept: CARDIOLOGY | Facility: CLINIC | Age: 60
End: 2025-02-28
Payer: COMMERCIAL

## 2025-02-28 VITALS
OXYGEN SATURATION: 96 % | SYSTOLIC BLOOD PRESSURE: 152 MMHG | BODY MASS INDEX: 30.72 KG/M2 | WEIGHT: 226.8 LBS | HEART RATE: 79 BPM | HEIGHT: 72 IN | DIASTOLIC BLOOD PRESSURE: 84 MMHG

## 2025-02-28 DIAGNOSIS — I47.10 PAROXYSMAL SUPRAVENTRICULAR TACHYCARDIA (CMS-HCC): Chronic | ICD-10-CM

## 2025-02-28 DIAGNOSIS — I48.0 PAROXYSMAL ATRIAL FIBRILLATION (MULTI): Primary | Chronic | ICD-10-CM

## 2025-02-28 DIAGNOSIS — E78.00 PURE HYPERCHOLESTEROLEMIA: ICD-10-CM

## 2025-02-28 PROBLEM — D72.819 LEUKOPENIA: Status: RESOLVED | Noted: 2023-11-03 | Resolved: 2025-02-28

## 2025-02-28 PROBLEM — M79.10 MYALGIA: Status: RESOLVED | Noted: 2023-11-03 | Resolved: 2025-02-28

## 2025-02-28 PROBLEM — R06.02 INCREASING SHORTNESS OF BREATH: Status: RESOLVED | Noted: 2023-11-03 | Resolved: 2025-02-28

## 2025-02-28 PROBLEM — K40.90 LEFT INGUINAL HERNIA: Status: RESOLVED | Noted: 2023-11-03 | Resolved: 2025-02-28

## 2025-02-28 PROBLEM — M62.81 MUSCLE WEAKNESS OF EXTREMITY: Status: RESOLVED | Noted: 2023-11-03 | Resolved: 2025-02-28

## 2025-02-28 PROBLEM — R35.0 URINARY FREQUENCY: Status: RESOLVED | Noted: 2023-11-03 | Resolved: 2025-02-28

## 2025-02-28 PROCEDURE — 3008F BODY MASS INDEX DOCD: CPT | Performed by: INTERNAL MEDICINE

## 2025-02-28 PROCEDURE — 99214 OFFICE O/P EST MOD 30 MIN: CPT | Performed by: INTERNAL MEDICINE

## 2025-02-28 PROCEDURE — 1036F TOBACCO NON-USER: CPT | Performed by: INTERNAL MEDICINE

## 2025-02-28 RX ORDER — DILTIAZEM HYDROCHLORIDE 120 MG/1
120 CAPSULE, COATED, EXTENDED RELEASE ORAL DAILY
Qty: 30 CAPSULE | Refills: 11 | Status: SHIPPED | OUTPATIENT
Start: 2025-02-28 | End: 2026-02-28

## 2025-02-28 RX ORDER — FERROUS SULFATE 325(65) MG
325 TABLET ORAL AS NEEDED
COMMUNITY

## 2025-02-28 RX ORDER — TURMERIC 400 MG
1 CAPSULE ORAL 2 TIMES DAILY
COMMUNITY

## 2025-02-28 NOTE — ASSESSMENT & PLAN NOTE
Given the presence of coronary artery calcifications, I would recommend aggressive LDL reduction.  If Elías is unable to take higher doses of a statin for further LDL reduction, a PCSK9 inhibitor could be considered.

## 2025-02-28 NOTE — ASSESSMENT & PLAN NOTE
Brief runs of SVT up to 20 beats in duration were noted on the Holter monitor.  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  He did have an EP study at [facility] in the past and the arrhythmia was not inducible and could not be ablated.  I have recommended a trial of a calcium channel blocker and we will place him on Cardizem  mg daily.  Metoprolol will be discontinued.  The dose will be titrated up as needed.  EP follow-up in 8 to 10 months.

## 2025-02-28 NOTE — PROGRESS NOTES
History Of Present Illness:      This is a 59-year-old male with paroxysmal atrial fibrillation.  He presents today for a follow-up evaluation after completing an extended Holter monitor.  The patient is having some episodes of palpitations.  They are usually very brief.  The metoprolol has not been effective.  He states that he has episodes of atrial fibrillation about once or twice per year.    Elías was initially seen in consultation December 16, 2024 at the request of Dr. Urbina.  The patient has a history of SVT and had an attempted ablation at [facility] 10 to 15 years ago.  He states that at that time they could not induce tachycardia and therefore no ablation was performed.  During the years of 2019/2020 he began experiencing episodes of atrial fibrillation.  The first episode was documented in 2019.  Another episode occurred 2 months ago and he was going to be evaluated in the emergency department at [facility].  However, because of the long wait in the waiting room he ended up leaving without evaluation but he had spontaneously converted to sinus rhythm.  He also reports feeling frequent palpitations and based on the description may be isolated PVCs.     Past surgical history:     Hernia repair  Knee surgery     Social history: Non-smoker     Family history: Father had bypass surgery at 77 years of age.    Review of Systems  Other review of systems negative  Last Recorded Vitals:      10/8/2024     2:49 PM 10/8/2024     7:52 PM 10/25/2024     8:04 AM 11/7/2024     3:05 PM 11/7/2024     3:26 PM 11/14/2024     9:26 AM 12/16/2024     3:27 PM   Vitals   Systolic 108 122 138 110 120 112 126   Diastolic 75 70 81 60 80 70 74   BP Location  Left arm   Right arm Right arm Left arm   Heart Rate 102 91 64 97  86 97   Temp 36.4 °C (97.6 °F)  36.2 °C (97.2 °F)       Resp 16 16 16       Height 1.829 m (6')   1.829 m (6')  1.829 m (6') 1.829 m (6')   Weight (lb) 220  218 227  225 228   BMI 29.84 kg/m2  29.57 kg/m2 30.79  kg/m2  30.52 kg/m2 30.92 kg/m2   BSA (m2) 2.25 m2  2.24 m2 2.29 m2  2.28 m2 2.29 m2   Visit Report   Report Report Report Report Report     Allergies:  Patient has no known allergies.  Outpatient Medications:  Current Outpatient Medications   Medication Instructions    ascorbic acid-multivit-min (Emergen-C) 1,000 mg powder effervescent in packet Take by mouth.    atorvastatin (LIPITOR) 40 mg, oral, Daily    ezetimibe (ZETIA) 10 mg, oral, Daily    glucosamine-chondroitin (Osteo Bi-Flex) 250-200 mg tablet 2 times daily     mg, 3 times daily    metoprolol tartrate (LOPRESSOR) 25 mg, oral, Daily    multivitamin with minerals iron-free (Centrum Silver) 1 tablet, Daily    omega-3 fatty acids-fish oil 360-1,200 mg capsule 2 times daily    zinc acetate 50 mg (zinc) capsule Take by mouth.       Physical Exam:    General Appearance:  Alert, oriented, no distress  Skin:  Warm and dry  Head and Neck:  No elevation of JVP, no carotid bruits  Cardiac Exam:  Rhythm is regular, S1 and S2 are normal, no murmur S3 or S4  Lungs:  Clear to auscultation  Extremities:  no edema  Neurologic:  No focal deficits  Psychiatric:  Appropriate mood and behavior    Lab Results:    CMP:  Recent Labs     02/04/25  1509 11/14/24  0950 11/09/23  0959 08/29/22  0854 10/26/21  0825 01/27/21  0727 03/17/20  0527 03/16/20  1433 10/20/19  0625 10/18/19  1012    141 142 141 142 141 143 143   < > 139   K 4.2 4.5 4.3 4.6 4.8 4.6 4.3 3.9   < > 3.6    107 107 105 106 106 108* 109*   < > 105   CO2 25 23 24 27 29 29 29 23   < > 27   ANIONGAP 10 16 15 14 12 11 10 15   < > 11   BUN 21 29* 21 13 19 17 17 14   < > 19   CREATININE 0.58* 0.76 0.74 0.75 0.69 0.78 0.93 0.93   < > 0.72   EGFR 113 >90 >90  --   --   --   --   --   --   --    MG  --   --   --   --   --   --   --  1.72  --  2.02    < > = values in this interval not displayed.     Recent Labs     11/14/24  0950 11/09/23  0959 08/29/22  0854 10/26/21  0825 01/27/21  0727   ALBUMIN 4.7 4.4  4.5 4.5 4.5   ALKPHOS 74 79 63 88 68   ALT 55* 37 32 38 41   AST 38 31 21 32 36   BILITOT 1.9* 1.5* 1.5* 1.8* 2.0*     CBC:  Recent Labs     11/14/24  0950 11/09/23  0959 08/29/22  0854 01/27/21  0727 03/17/20  0527 03/16/20  1433 10/20/19  0625 10/18/19  1012   WBC 5.6 6.6 6.0 5.3 5.9 8.8 5.5 8.2   HGB 14.9 13.4* 16.0 14.4 14.4 15.3 12.8* 14.4   HCT 45.2 42.1 47.8 43.4 43.3 46.6 38.9* 44.0    210 152 155 199 228 195 232   MCV 92 91 94 94 88 90 89 91     COAG:   Recent Labs     03/16/20  1433 10/18/19  1258   INR 1.0 1.0     Cardiology Tests (personally reviewed):    Echocardiogram May 2020: Ejection fraction 55 to 60%.    Echocardiogram January 2025: Ejection fraction 55 to 60%, normal left atrial size.    Holter monitor January 2025: Sinus rhythm with a minimum heart rate of 54 bpm, maximum heart rate 143 bpm, and average heart rate 87 bpm.  No episodes of atrial fibrillation.  Brief runs of SVT up to 20 beats in duration.       Assessment/Plan   Problem List Items Addressed This Visit             ICD-10-CM    Paroxysmal atrial fibrillation (Multi) - Primary (Chronic) I48.0     The recurrences of atrial fibrillation are rare.  He does not require anticoagulation or an antiarrhythmic drug at this time.         Relevant Medications    dilTIAZem CD (Cardizem CD) 120 mg 24 hr capsule    Hyperlipidemia E78.5     Given the presence of coronary artery calcifications, I would recommend aggressive LDL reduction.  If Elías is unable to take higher doses of a statin for further LDL reduction, a PCSK9 inhibitor could be considered.         Paroxysmal supraventricular tachycardia (CMS-HCC) (Chronic) I47.10     Brief runs of SVT up to 20 beats in duration were noted on the Holter monitor.  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  He did have an EP study at [facility] in the past and the arrhythmia was not inducible and could not be ablated.  I have recommended a trial of a calcium channel blocker and we will  place him on Cardizem  mg daily.  Metoprolol will be discontinued.  The dose will be titrated up as needed.  EP follow-up in 8 to 10 months.         Relevant Medications    dilTIAZem CD (Cardizem CD) 120 mg 24 hr capsule     James C Ramicone, DO

## 2025-02-28 NOTE — ASSESSMENT & PLAN NOTE
The recurrences of atrial fibrillation are rare.  He does not require anticoagulation or an antiarrhythmic drug at this time.

## 2025-02-28 NOTE — LETTER
February 28, 2025     Eric Miller DO  [address] [facility] [address] [place] [address]    Patient: Elías Hughes   YOB: 1965   Date of Visit: 2/28/2025       Dear Eric,    I saw Elías Hughes today a follow-up evaluation. Below are my notes for this consultation.  If you have questions, please do not hesitate to call me. I look forward to following your patient along with you.       Sincerely,     James C Ramicone, DO      CC: No Recipients  ______________________________________________________________________________________        History Of Present Illness:      This is a 59-year-old male with paroxysmal atrial fibrillation.  He presents today for a follow-up evaluation after completing an extended Holter monitor.  The patient is having some episodes of palpitations.  They are usually very brief.  The metoprolol has not been effective.  He states that he has episodes of atrial fibrillation about once or twice per year.    Elías was initially seen in consultation December 16, 2024 at the request of Dr. Urbina.  The patient has a history of SVT and had an attempted ablation at [facility] 10 to 15 years ago.  He states that at that time they could not induce tachycardia and therefore no ablation was performed.  During the years of 2019/2020 he began experiencing episodes of atrial fibrillation.  The first episode was documented in 2019.  Another episode occurred 2 months ago and he was going to be evaluated in the emergency department at [facility].  However, because of the long wait in the waiting room he ended up leaving without evaluation but he had spontaneously converted to sinus rhythm.  He also reports feeling frequent palpitations and based on the description may be isolated PVCs.     Past surgical history:     Hernia repair  Knee surgery     Social history: Non-smoker     Family history: Father had bypass surgery at 77 years of age.    Review of Systems  Other review of systems negative  Last Recorded  Vitals:      10/8/2024     2:49 PM 10/8/2024     7:52 PM 10/25/2024     8:04 AM 11/7/2024     3:05 PM 11/7/2024     3:26 PM 11/14/2024     9:26 AM 12/16/2024     3:27 PM   Vitals   Systolic 108 122 138 110 120 112 126   Diastolic 75 70 81 60 80 70 74   BP Location  Left arm   Right arm Right arm Left arm   Heart Rate 102 91 64 97  86 97   Temp 36.4 °C (97.6 °F)  36.2 °C (97.2 °F)       Resp 16 16 16       Height 1.829 m (6')   1.829 m (6')  1.829 m (6') 1.829 m (6')   Weight (lb) 220  218 227  225 228   BMI 29.84 kg/m2  29.57 kg/m2 30.79 kg/m2  30.52 kg/m2 30.92 kg/m2   BSA (m2) 2.25 m2  2.24 m2 2.29 m2  2.28 m2 2.29 m2   Visit Report   Report Report Report Report Report     Allergies:  Patient has no known allergies.  Outpatient Medications:  Current Outpatient Medications   Medication Instructions   • ascorbic acid-multivit-min (Emergen-C) 1,000 mg powder effervescent in packet Take by mouth.   • atorvastatin (LIPITOR) 40 mg, oral, Daily   • ezetimibe (ZETIA) 10 mg, oral, Daily   • glucosamine-chondroitin (Osteo Bi-Flex) 250-200 mg tablet 2 times daily   •  mg, 3 times daily   • metoprolol tartrate (LOPRESSOR) 25 mg, oral, Daily   • multivitamin with minerals iron-free (Centrum Silver) 1 tablet, Daily   • omega-3 fatty acids-fish oil 360-1,200 mg capsule 2 times daily   • zinc acetate 50 mg (zinc) capsule Take by mouth.       Physical Exam:    General Appearance:  Alert, oriented, no distress  Skin:  Warm and dry  Head and Neck:  No elevation of JVP, no carotid bruits  Cardiac Exam:  Rhythm is regular, S1 and S2 are normal, no murmur S3 or S4  Lungs:  Clear to auscultation  Extremities:  no edema  Neurologic:  No focal deficits  Psychiatric:  Appropriate mood and behavior    Lab Results:    CMP:  Recent Labs     02/04/25  1509 11/14/24  0950 11/09/23  0959 08/29/22  0854 10/26/21  0825 01/27/21  0727 03/17/20  0527 03/16/20  1433 10/20/19  0625 10/18/19  1012    141 142 141 142 141 143 143   < > 139    K 4.2 4.5 4.3 4.6 4.8 4.6 4.3 3.9   < > 3.6    107 107 105 106 106 108* 109*   < > 105   CO2 25 23 24 27 29 29 29 23   < > 27   ANIONGAP 10 16 15 14 12 11 10 15   < > 11   BUN 21 29* 21 13 19 17 17 14   < > 19   CREATININE 0.58* 0.76 0.74 0.75 0.69 0.78 0.93 0.93   < > 0.72   EGFR 113 >90 >90  --   --   --   --   --   --   --    MG  --   --   --   --   --   --   --  1.72  --  2.02    < > = values in this interval not displayed.     Recent Labs     11/14/24  0950 11/09/23  0959 08/29/22  0854 10/26/21  0825 01/27/21  0727   ALBUMIN 4.7 4.4 4.5 4.5 4.5   ALKPHOS 74 79 63 88 68   ALT 55* 37 32 38 41   AST 38 31 21 32 36   BILITOT 1.9* 1.5* 1.5* 1.8* 2.0*     CBC:  Recent Labs     11/14/24  0950 11/09/23  0959 08/29/22  0854 01/27/21  0727 03/17/20  0527 03/16/20  1433 10/20/19  0625 10/18/19  1012   WBC 5.6 6.6 6.0 5.3 5.9 8.8 5.5 8.2   HGB 14.9 13.4* 16.0 14.4 14.4 15.3 12.8* 14.4   HCT 45.2 42.1 47.8 43.4 43.3 46.6 38.9* 44.0    210 152 155 199 228 195 232   MCV 92 91 94 94 88 90 89 91     COAG:   Recent Labs     03/16/20  1433 10/18/19  1258   INR 1.0 1.0     Cardiology Tests (personally reviewed):    Echocardiogram May 2020: Ejection fraction 55 to 60%.    Echocardiogram January 2025: Ejection fraction 55 to 60%, normal left atrial size.    Holter monitor January 2025: Sinus rhythm with a minimum heart rate of 54 bpm, maximum heart rate 143 bpm, and average heart rate 87 bpm.  No episodes of atrial fibrillation.  Brief runs of SVT up to 20 beats in duration.       Assessment/Plan  Problem List Items Addressed This Visit             ICD-10-CM    Paroxysmal atrial fibrillation (Multi) - Primary (Chronic) I48.0     The recurrences of atrial fibrillation are rare.  He does not require anticoagulation or an antiarrhythmic drug at this time.         Relevant Medications    dilTIAZem CD (Cardizem CD) 120 mg 24 hr capsule    Hyperlipidemia E78.5     Given the presence of coronary artery calcifications, I  would recommend aggressive LDL reduction.  If Elías is unable to take higher doses of a statin for further LDL reduction, a PCSK9 inhibitor could be considered.         Paroxysmal supraventricular tachycardia (CMS-HCC) (Chronic) I47.10     Brief runs of SVT up to 20 beats in duration were noted on the Holter monitor.  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  He did have an EP study at [facility] in the past and the arrhythmia was not inducible and could not be ablated.  I have recommended a trial of a calcium channel blocker and we will place him on Cardizem  mg daily.  Metoprolol will be discontinued.  The dose will be titrated up as needed.  EP follow-up in 8 to 10 months.         Relevant Medications    dilTIAZem CD (Cardizem CD) 120 mg 24 hr capsule     James C Ramicone, DO

## 2025-03-26 DIAGNOSIS — I47.10 PAROXYSMAL SUPRAVENTRICULAR TACHYCARDIA (CMS-HCC): Chronic | ICD-10-CM

## 2025-03-27 RX ORDER — DILTIAZEM HYDROCHLORIDE 120 MG/1
120 CAPSULE, COATED, EXTENDED RELEASE ORAL DAILY
Qty: 90 CAPSULE | Refills: 3 | Status: SHIPPED | OUTPATIENT
Start: 2025-03-27 | End: 2026-03-27

## 2025-04-21 PROBLEM — I51.89 LEFT VENTRICULAR SYSTOLIC DYSFUNCTION: Status: RESOLVED | Noted: 2023-11-03 | Resolved: 2025-04-21

## 2025-05-07 ENCOUNTER — OFFICE VISIT (OUTPATIENT)
Dept: CARDIOLOGY | Facility: CLINIC | Age: 60
End: 2025-05-07
Payer: COMMERCIAL

## 2025-05-07 VITALS
DIASTOLIC BLOOD PRESSURE: 74 MMHG | BODY MASS INDEX: 30.75 KG/M2 | HEART RATE: 91 BPM | HEIGHT: 72 IN | OXYGEN SATURATION: 97 % | SYSTOLIC BLOOD PRESSURE: 132 MMHG | WEIGHT: 227 LBS

## 2025-05-07 DIAGNOSIS — E66.811 CLASS 1 OBESITY WITHOUT SERIOUS COMORBIDITY WITH BODY MASS INDEX (BMI) OF 30.0 TO 30.9 IN ADULT, UNSPECIFIED OBESITY TYPE: ICD-10-CM

## 2025-05-07 DIAGNOSIS — R00.2 PALPITATIONS: Primary | ICD-10-CM

## 2025-05-07 DIAGNOSIS — E78.00 PURE HYPERCHOLESTEROLEMIA: ICD-10-CM

## 2025-05-07 DIAGNOSIS — I77.810 AORTIC ROOT DILATION (CMS-HCC): ICD-10-CM

## 2025-05-07 DIAGNOSIS — I48.0 PAROXYSMAL ATRIAL FIBRILLATION (MULTI): ICD-10-CM

## 2025-05-07 DIAGNOSIS — I51.89 LEFT VENTRICULAR SYSTOLIC DYSFUNCTION: ICD-10-CM

## 2025-05-07 LAB
ATRIAL RATE: 79 BPM
P AXIS: 37 DEGREES
P OFFSET: 190 MS
P ONSET: 139 MS
PR INTERVAL: 144 MS
Q ONSET: 211 MS
QRS COUNT: 13 BEATS
QRS DURATION: 94 MS
QT INTERVAL: 362 MS
QTC CALCULATION(BAZETT): 415 MS
QTC FREDERICIA: 396 MS
R AXIS: 1 DEGREES
T AXIS: 38 DEGREES
T OFFSET: 392 MS
VENTRICULAR RATE: 79 BPM

## 2025-05-07 PROCEDURE — 1036F TOBACCO NON-USER: CPT | Performed by: INTERNAL MEDICINE

## 2025-05-07 PROCEDURE — 3008F BODY MASS INDEX DOCD: CPT | Performed by: INTERNAL MEDICINE

## 2025-05-07 PROCEDURE — 93010 ELECTROCARDIOGRAM REPORT: CPT | Performed by: INTERNAL MEDICINE

## 2025-05-07 PROCEDURE — 99212 OFFICE O/P EST SF 10 MIN: CPT

## 2025-05-07 PROCEDURE — 99214 OFFICE O/P EST MOD 30 MIN: CPT | Performed by: INTERNAL MEDICINE

## 2025-05-07 PROCEDURE — 93005 ELECTROCARDIOGRAM TRACING: CPT | Performed by: INTERNAL MEDICINE

## 2025-05-07 NOTE — PROGRESS NOTES
Subjective  Elías Hughes  is a 59 y.o. year old male who presents for paroxysmal atrial fibrillation and PSVT.  He saw Dr. Ramicone 2/28/25 placed on CardizemCD for short bursts of PSVT.  Metoprolol discontinued. (Note reviewed) Feeling much better on Cardizem CD.  No chest pain, palpitations improved, no edema    Blood pressure 132/74, pulse 91, height 1.829 m (6'), weight 103 kg (227 lb), SpO2 97%.   Patient has no known allergies.  Medical History[1]  Surgical History[2]  Family History[3]  @SOC    Current Medications[4]     ROS  Review of Systems   All other systems reviewed and are negative.      Physical Exam  Physical Exam  Constitutional:       Appearance: He is obese.   HENT:      Head: Normocephalic and atraumatic.   Cardiovascular:      Rate and Rhythm: Normal rate and regular rhythm.   Pulmonary:      Effort: Pulmonary effort is normal.      Breath sounds: Normal breath sounds.   Abdominal:      Palpations: Abdomen is soft.   Musculoskeletal:      Right lower leg: No edema.      Left lower leg: No edema.   Neurological:      General: No focal deficit present.      Mental Status: He is alert and oriented to person, place, and time.   Psychiatric:         Mood and Affect: Mood normal.          EKG  Encounter Date: 05/07/25   ECG 12 Lead   Result Value    Ventricular Rate 79    Atrial Rate 79    NJ Interval 144    QRS Duration 94    QT Interval 362    QTC Calculation(Bazett) 415    P Axis 37    R Axis 1    T Axis 38    QRS Count 13    Q Onset 211    P Onset 139    P Offset 190    T Offset 392    QTC Fredericia 396    Narrative    Sinus rhythm with Premature atrial complexes  Otherwise normal ECG  When compared with ECG of 07-NOV-2024 14:56,  Premature atrial complexes are now Present       Problem List Items Addressed This Visit       Paroxysmal atrial fibrillation (Multi) (Chronic)    Palpitations - Primary    Relevant Orders    ECG 12 Lead (Completed)    Left ventricular systolic dysfunction     1/6/25 echocardiogram LVEf =55-60% moderate dialation aof the aortic root         Hyperlipidemia    Class 1 obesity with body mass index (BMI) of 30.0 to 30.9 in adult    Aortic root dilation (CMS-HCC)    1/6/25 moderate on echocardiogram              Same meds  Return 6 months with EKG and echocardiogram a few days prior to return      Derick Urbina MD        [1]   Past Medical History:  Diagnosis Date    Paroxysmal atrial fibrillation (Multi) 11/03/2023    Paroxysmal supraventricular tachycardia (CMS-HCC) 12/16/2024    Personal history of other diseases of the circulatory system     History of atrial fibrillation    Personal history of other diseases of the digestive system 06/12/2019    History of left inguinal hernia   [2]   Past Surgical History:  Procedure Laterality Date    KNEE SURGERY  12/17/2014    Knee Surgery    OTHER SURGICAL HISTORY  03/22/2021    Back surgery    OTHER SURGICAL HISTORY  03/22/2021    Hand surgery    OTHER SURGICAL HISTORY  05/13/2020    Colonoscopy   [3] No family history on file.  [4]   Current Outpatient Medications   Medication Sig Dispense Refill    ascorbic acid-multivit-min (Emergen-C) 1,000 mg powder effervescent in packet Take by mouth.      atorvastatin (Lipitor) 40 mg tablet Take 1 tablet (40 mg) by mouth once daily. 90 tablet 3    dilTIAZem CD (Cardizem CD) 120 mg 24 hr capsule Take 1 capsule (120 mg) by mouth once daily. 90 capsule 3    ezetimibe (Zetia) 10 mg tablet Take 1 tablet (10 mg) by mouth once daily. 90 tablet 3    ferrous sulfate tablet Take 1 tablet (325 mg) by mouth if needed.      glucosamine-chondroitin (Osteo Bi-Flex) 250-200 mg tablet Take by mouth twice a day.      multivitamin with minerals iron-free (Centrum Silver) Take 1 tablet by mouth once daily.      omega-3 fatty acids-fish oil 360-1,200 mg capsule Take by mouth twice a day.      turmeric 400 mg capsule Take 1 capsule by mouth 2 times a day.      zinc acetate 50 mg (zinc) capsule Take by mouth.        No current facility-administered medications for this visit.

## 2025-08-15 ENCOUNTER — OFFICE VISIT (OUTPATIENT)
Dept: URGENT CARE | Age: 60
End: 2025-08-15
Payer: COMMERCIAL

## 2025-08-15 VITALS
RESPIRATION RATE: 18 BRPM | BODY MASS INDEX: 29.8 KG/M2 | SYSTOLIC BLOOD PRESSURE: 116 MMHG | HEART RATE: 80 BPM | TEMPERATURE: 98 F | HEIGHT: 72 IN | OXYGEN SATURATION: 98 % | WEIGHT: 220 LBS | DIASTOLIC BLOOD PRESSURE: 74 MMHG

## 2025-08-15 DIAGNOSIS — M70.42 BURSITIS, PREPATELLAR, LEFT: Primary | ICD-10-CM
